# Patient Record
Sex: FEMALE | Race: WHITE | NOT HISPANIC OR LATINO | Employment: UNEMPLOYED | URBAN - METROPOLITAN AREA
[De-identification: names, ages, dates, MRNs, and addresses within clinical notes are randomized per-mention and may not be internally consistent; named-entity substitution may affect disease eponyms.]

---

## 2024-01-29 ENCOUNTER — TELEPHONE (OUTPATIENT)
Dept: BARIATRICS | Facility: CLINIC | Age: 64
End: 2024-01-29

## 2024-01-29 NOTE — TELEPHONE ENCOUNTER
Called patient to reschedule 2nd cancellation of her annual, no answer, left message to call back.

## 2024-10-22 ENCOUNTER — OFFICE VISIT (OUTPATIENT)
Dept: BARIATRICS | Facility: CLINIC | Age: 64
End: 2024-10-22
Payer: COMMERCIAL

## 2024-10-22 VITALS
SYSTOLIC BLOOD PRESSURE: 116 MMHG | RESPIRATION RATE: 18 BRPM | DIASTOLIC BLOOD PRESSURE: 58 MMHG | OXYGEN SATURATION: 98 % | HEART RATE: 87 BPM | HEIGHT: 64 IN | WEIGHT: 133 LBS | TEMPERATURE: 98.6 F | BODY MASS INDEX: 22.71 KG/M2

## 2024-10-22 DIAGNOSIS — Z12.31 ENCOUNTER FOR SCREENING MAMMOGRAM FOR MALIGNANT NEOPLASM OF BREAST: ICD-10-CM

## 2024-10-22 DIAGNOSIS — Z98.84 BARIATRIC SURGERY STATUS: ICD-10-CM

## 2024-10-22 DIAGNOSIS — Z12.39 SCREENING FOR BREAST CANCER: ICD-10-CM

## 2024-10-22 DIAGNOSIS — Z48.815 ENCOUNTER FOR SURGICAL AFTERCARE FOLLOWING SURGERY OF DIGESTIVE SYSTEM: Primary | ICD-10-CM

## 2024-10-22 DIAGNOSIS — K91.2 POSTSURGICAL MALABSORPTION: ICD-10-CM

## 2024-10-22 PROCEDURE — 99214 OFFICE O/P EST MOD 30 MIN: CPT | Performed by: PHYSICIAN ASSISTANT

## 2024-10-22 RX ORDER — RIBOFLAVIN (VITAMIN B2) 100 MG
100 TABLET ORAL DAILY
COMMUNITY

## 2024-10-22 NOTE — PROGRESS NOTES
Date of surgery: 3/5/2019  Procedure: LAP   Performing surgeon: Dr. Elías Desai     Initial Weight - 268.0 lb   Current Weight - 133.0 lb   Warren Weight -  122.0 lb   Total Body Weight Loss (EWL)-  134.9   EWL% - 111%  TWB % - 50%

## 2024-10-22 NOTE — PROGRESS NOTES
Assessment/Plan:     Patient ID: Gabriela Tarango is a 64 y.o. female.     Bariatric Surgery Status    s/p Revisional Surgery-robotic extensive ELDER of marginal ulcer ,robotic partial gastrectomy, small bowel resection, robotic gastrojejunostomy anastomosis  with Dr. Elías Jensen on 3/5/2019. Presents today for OD annual visit. Overall doing well, tolerating a regular diet. Denies having any abdominal pain, N/V/D/C, regurgitation, reflux, or dysphagia.. Taking her multivitamins daily.     Requesting letter for her vitamins.     Continued/Maintain healthy weight loss with good nutrition intakes.  Adequate hydration with at least 64oz. fluid intake.  Follow diet as discussed.  Follow vitamin and mineral recommendations as reviewed with you.  Exercise as tolerated.    Colonoscopy referral made: pt unsure if she is due yet - had one 5 years ago  Mammo: due - will order     Follow-up in 1 year. We kindly ask that your arrive 15 minutes before your scheduled appointment time with your provider to allow our staff to room you, get your vital signs and update your chart.    Get lab work done . Please call the office if you need a script.  It is recommended to check with your insurance BEFORE getting labs done to make sure they are covered by your policy.      Call our office if you have any problems with abdominal pain especially associated with fever, chills, nausea, vomiting or any other concerns.    All  Post-bariatric surgery patients should be aware that very small quantities of any alcohol can cause impairment and it is very possible not to feel the effect. The effect can be in the system for several hours.  It is also a stomach irritant.     It is advised to AVOID alcohol, Nonsteroidal antiinflammatory drugs (NSAIDS) and nicotine of all forms . Any of these can cause stomach irritation/pain.    Discussed the effects of alcohol on a bariatric patient and the increased impairment risk.     Keep up the good work!      Postsurgical Malabsorption   -At risk for malabsorption of vitamins/minerals secondary to malabsorption and restriction of intake from bariatric surgery  -Currently taking adequate postop bariatric surgery vitamin supplementation  -Next set of bariatric labs ordered for approximately 2 weeks  -Patient received education about the importance of adhering to a lifelong supplementation regimen to avoid vitamin/mineral deficiencies      Diagnoses and all orders for this visit:    Encounter for surgical aftercare following surgery of digestive system  -     Zinc; Future  -     Vitamin D 25 hydroxy; Future  -     Vitamin B12; Future  -     Vitamin B1, whole blood; Future  -     Vitamin A; Future  -     PTH, intact; Future  -     CBC and Platelet; Future  -     Comprehensive metabolic panel; Future  -     Ferritin; Future  -     Folate; Future  -     TIBC Panel (incl. Iron, TIBC, % Iron Saturation); Future    Bariatric surgery status  -     Zinc; Future  -     Vitamin D 25 hydroxy; Future  -     Vitamin B12; Future  -     Vitamin B1, whole blood; Future  -     Vitamin A; Future  -     PTH, intact; Future  -     CBC and Platelet; Future  -     Comprehensive metabolic panel; Future  -     Ferritin; Future  -     Folate; Future  -     TIBC Panel (incl. Iron, TIBC, % Iron Saturation); Future    Postsurgical malabsorption  -     Zinc; Future  -     Vitamin D 25 hydroxy; Future  -     Vitamin B12; Future  -     Vitamin B1, whole blood; Future  -     Vitamin A; Future  -     PTH, intact; Future  -     CBC and Platelet; Future  -     Comprehensive metabolic panel; Future  -     Ferritin; Future  -     Folate; Future  -     TIBC Panel (incl. Iron, TIBC, % Iron Saturation); Future    BMI 22.0-22.9, adult    Screening for breast cancer  -     Mammo screening bilateral w 3d and cad; Future    Encounter for screening mammogram for malignant neoplasm of breast  -     Mammo screening bilateral w 3d and cad; Future    Other orders  -    "  Ascorbic Acid (vitamin C) 100 MG tablet; Take 100 mg by mouth daily Pt doesn't know the mg         Subjective:      Patient ID: Gabriela Tarango is a 64 y.o. female.    s/p Revisional Surgery-robotic extensive ELDER of marginal ulcer ,robotic partial gastrectomy, small bowel resection, robotic gastrojejunostomy anastomosis  with Dr. Elías Jensen on 3/5/2019. Presents today for OD annual visit. Overall doing well,     Initial:298  Current:133  EWL: (Weight loss is ahead of schedule at this post surgical period.)  Warren: 122  Current BMI is Body mass index is 22.83 kg/m².    Tolerating a regular diet-yes  Eating at least 60 grams of protein per day-yes  Drinking at least 64 ounces of fluid per day-yes  Sufficient exercise-no  Using NSAIDs regularly-no  Using nicotine-no  Using alcohol-occasional   Supplements: Multivitamins, B-12, and Calcium and vit C and calcium    EWL is 111%, which places the patient ahead of schedule for expected post surgical weight loss at this time.     The following portions of the patient's history were reviewed and updated as appropriate: allergies, current medications, past family history, past medical history, past social history, past surgical history and problem list.    Review of Systems   Constitutional: Negative.    Respiratory: Negative.     Cardiovascular: Negative.    Gastrointestinal: Negative.    Musculoskeletal:  Positive for arthralgias.   Neurological: Negative.    Psychiatric/Behavioral: Negative.           Objective:    /58 (BP Location: Left arm, Patient Position: Sitting, Cuff Size: Adult)   Pulse 87   Temp 98.6 °F (37 °C) (Tympanic)   Resp 18   Ht 5' 4\" (1.626 m)   Wt 60.3 kg (133 lb)   SpO2 98%   BMI 22.83 kg/m²      Physical Exam  Vitals and nursing note reviewed.   Constitutional:       Appearance: Normal appearance.   HENT:      Head: Normocephalic and atraumatic.   Eyes:      Extraocular Movements: Extraocular movements intact.   Cardiovascular:      Rate and " Rhythm: Normal rate.   Pulmonary:      Effort: Pulmonary effort is normal.   Musculoskeletal:         General: Normal range of motion.      Cervical back: Normal range of motion.   Skin:     General: Skin is warm and dry.   Neurological:      General: No focal deficit present.      Mental Status: She is alert and oriented to person, place, and time.   Psychiatric:         Mood and Affect: Mood normal.

## 2024-11-05 ENCOUNTER — TELEPHONE (OUTPATIENT)
Age: 64
End: 2024-11-05

## 2024-11-05 NOTE — TELEPHONE ENCOUNTER
Pt called in stating completed blood work a week a go and would like the office share the results. Pt had the blood work at \[facility]\"". Pt was told will receive a call from the practice once we have the results.

## 2024-11-13 ENCOUNTER — TELEPHONE (OUTPATIENT)
Dept: BARIATRICS | Facility: CLINIC | Age: 64
End: 2024-11-13

## 2024-11-13 NOTE — TELEPHONE ENCOUNTER
Can you please call pt, reviewed fax copy of her labs:    Your iron stores (ferritin) is low  Recommend that you add one high potency iron- (such as Vitron C 65 mg) -this is over-the-counter and  available at [facility] pharmacy or on-line). Take this once a day for 2 weeks.  Iron can be constipating so also recommend a daily stool softener OR miralax daily.  If you tolerate the high dose iron for 2 weeks then increase it to twice a day for 2 more weeks. If you continue to tolerate it, then increase it to 3 times per day. (stay on the highest dose you can tolerate -one to three Vitron C per day)     If you cannot tolerate the oral iron, please call the office to let us know.    Your levels should be rechecked in 3-5 months. Please call our office if you need us to mail you a script.